# Patient Record
Sex: FEMALE | Race: WHITE | URBAN - METROPOLITAN AREA
[De-identification: names, ages, dates, MRNs, and addresses within clinical notes are randomized per-mention and may not be internally consistent; named-entity substitution may affect disease eponyms.]

---

## 2019-11-14 ENCOUNTER — PATIENT OUTREACH (OUTPATIENT)
Dept: OTHER | Age: 44
End: 2019-11-14

## 2019-11-14 NOTE — PROGRESS NOTES
Patient on report as eligible for Case Management. Left discreet message on voicemail with this CM contact information. Will attempt to contact again to offer [phone] Management services.

## 2019-11-21 ENCOUNTER — PATIENT OUTREACH (OUTPATIENT)
Dept: OTHER | Age: 44
End: 2019-11-21

## 2019-11-21 NOTE — PROGRESS NOTES
Telephonic outreach to patient, who informed this CM that she was terminated from [employer] back in June and no longer carries [employer]. Offered the assist with contacting new insurance, patient states that she is looking for new employment and will be obtaining insurance soon.